# Patient Record
(demographics unavailable — no encounter records)

---

## 2024-10-15 NOTE — PLAN
[FreeTextEntry1] : Annual gyn exam - Pap/HPV/STD sent; Hx/o incidental finding of Thickened endometrium on CT scan - U/S referral given - RTO for results review and management.    -I spent a total of 50 minutes on date of the encounter reviewing patient's medical / surgical and Ob/Gyn history, evaluating, counseling and treating the patient.

## 2024-10-15 NOTE — PHYSICAL EXAM
[Chaperone Present] : A chaperone was present in the examining room during all aspects of the physical examination [[id number]] : A chaperone was present during the pelvic exam. [Appropriately responsive] : appropriately responsive [Alert] : alert [No Acute Distress] : no acute distress [No Lymphadenopathy] : no lymphadenopathy [Soft] : soft [Non-tender] : non-tender [Non-distended] : non-distended [No HSM] : No HSM [No Lesions] : no lesions [No Mass] : no mass [Oriented x3] : oriented x3 [Examination Of The Breasts] : a normal appearance [No Masses] : no breast masses were palpable [Labia Majora] : normal [Labia Minora] : normal [Normal] : normal

## 2024-10-15 NOTE — HISTORY OF PRESENT ILLNESS
[FreeTextEntry1] : Accompanied by her daughter who translated from Croatian to English gave patient's history:  Annual Gyn exam   Last Pap ~ > 2 years ago   since 53 years of age, no hx/o PMB, referred by PCP for an incidental finding of thickened endometrial lining on CT scan on (07/10/2024) during post-partial Gastrectomy for Stomach cancer(), unsure what hospital the surgery was performed and the pathology of the specimen, denied need for postop radiation or chemotherapy.  Patient denies any recent hx/o vaginal bleeding/spotting.   Last Mammogram ~ 2 months ago - WNL   / s/p  x 4 / Denies hx/o bladder dysfunction  Lives with her daughter. [Post-Menopause, No Sxs] : post-menopausal, currently without symptoms [Menopause Age: ____] : age at menopause was [unfilled] [No] : Patient does not have concerns regarding sex [Previously active] : previously active [Women] : women

## 2024-10-15 NOTE — PHYSICAL EXAM
[Chaperone Present] : A chaperone was present in the examining room during all aspects of the physical examination [[id number]] : A chaperone was present during the pelvic exam. [Appropriately responsive] : appropriately responsive [Alert] : alert [No Acute Distress] : no acute distress [No Lymphadenopathy] : no lymphadenopathy [Soft] : soft [Non-tender] : non-tender [Non-distended] : non-distended [No HSM] : No HSM [No Lesions] : no lesions [No Mass] : no mass [Oriented x3] : oriented x3 [Examination Of The Breasts] : a normal appearance [No Masses] : no breast masses were palpable [Labia Majora] : normal [Labia Minora] : normal [Normal] : normal

## 2024-10-15 NOTE — HISTORY OF PRESENT ILLNESS
[FreeTextEntry1] : Accompanied by her daughter who translated from Uzbek to English gave patient's history:  Annual Gyn exam   Last Pap ~ > 2 years ago   since 53 years of age, no hx/o PMB, referred by PCP for an incidental finding of thickened endometrial lining on CT scan on (07/10/2024) during post-partial Gastrectomy for Stomach cancer(), unsure what hospital the surgery was performed and the pathology of the specimen, denied need for postop radiation or chemotherapy.  Patient denies any recent hx/o vaginal bleeding/spotting.   Last Mammogram ~ 2 months ago - WNL   / s/p  x 4 / Denies hx/o bladder dysfunction  Lives with her daughter. [Post-Menopause, No Sxs] : post-menopausal, currently without symptoms [Menopause Age: ____] : age at menopause was [unfilled] [No] : Patient does not have concerns regarding sex [Previously active] : previously active [Women] : women

## 2024-10-29 NOTE — PLAN
[FreeTextEntry1] : Hx/o Thickened endometrium ~ 12 mm - will return for OR booking after further discussion with her family.

## 2024-10-29 NOTE — HISTORY OF PRESENT ILLNESS
[FreeTextEntry1] : Presents w/her daughter who is the patient's  and caregiver:  Presents for f/u of Pelvic U/S report (08/09/24) ordered by patient's Oncology indicating thickened endometrial lining ~ 12 mm.  Patient is postmenopausal denies hx/o postmenopausal bleeding.  I reviewed report w/patient's daughter indicating the reason why a D&C would be recommended for diagnostic and therapeutic reasons.  Her daughter expressed understanding, the risks/complications of the D&C were also discussed w/her daughter who subsequently decided she will discuss further w/her mother and family at home and then return to book the surgery.  Patient's daughter was made aware it would be more effective to book the surgery since we won't be able to obtain date less than 4-6 weeks and can always cancel the date if pt opts out.  Patient's daughter states she would rather return after further discussion with her family.   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